# Patient Record
Sex: MALE | Race: WHITE | NOT HISPANIC OR LATINO | Employment: OTHER | URBAN - METROPOLITAN AREA
[De-identification: names, ages, dates, MRNs, and addresses within clinical notes are randomized per-mention and may not be internally consistent; named-entity substitution may affect disease eponyms.]

---

## 2017-01-13 ENCOUNTER — PRE VISIT (OUTPATIENT)
Dept: UROLOGY | Facility: CLINIC | Age: 77
End: 2017-01-13

## 2017-01-25 ENCOUNTER — TELEPHONE (OUTPATIENT)
Dept: UROLOGY | Facility: CLINIC | Age: 77
End: 2017-01-25

## 2017-01-25 ENCOUNTER — OFFICE VISIT (OUTPATIENT)
Dept: UROLOGY | Facility: CLINIC | Age: 77
End: 2017-01-25

## 2017-01-25 VITALS
DIASTOLIC BLOOD PRESSURE: 81 MMHG | SYSTOLIC BLOOD PRESSURE: 151 MMHG | WEIGHT: 163 LBS | HEART RATE: 53 BPM | BODY MASS INDEX: 24.14 KG/M2 | HEIGHT: 69 IN

## 2017-01-25 DIAGNOSIS — N52.9 ERECTILE DYSFUNCTION, UNSPECIFIED ERECTILE DYSFUNCTION TYPE: ICD-10-CM

## 2017-01-25 DIAGNOSIS — N39.46 MIXED INCONTINENCE: Primary | ICD-10-CM

## 2017-01-25 DIAGNOSIS — N52.9 ERECTILE DYSFUNCTION, UNSPECIFIED ERECTILE DYSFUNCTION TYPE: Primary | ICD-10-CM

## 2017-01-25 DIAGNOSIS — R35.1 NOCTURIA: ICD-10-CM

## 2017-01-25 RX ORDER — AZATHIOPRINE 50 MG/1
50 TABLET ORAL
COMMUNITY
Start: 2016-12-19 | End: 2017-01-25

## 2017-01-25 RX ORDER — CARVEDILOL 25 MG/1
25 TABLET ORAL
COMMUNITY
Start: 2016-06-22

## 2017-01-25 RX ORDER — ACETAMINOPHEN 160 MG
2000 TABLET,DISINTEGRATING ORAL
COMMUNITY
Start: 2016-05-06 | End: 2017-01-25

## 2017-01-25 RX ORDER — ATORVASTATIN CALCIUM 40 MG/1
40 TABLET, FILM COATED ORAL
COMMUNITY
Start: 2016-05-31 | End: 2020-07-31

## 2017-01-25 RX ORDER — CLOBETASOL PROPIONATE 0.5 MG/G
CREAM TOPICAL
COMMUNITY
Start: 2014-05-05 | End: 2017-01-25

## 2017-01-25 RX ORDER — ALFUZOSIN HYDROCHLORIDE 10 MG/1
10 TABLET, EXTENDED RELEASE ORAL DAILY
Qty: 90 TABLET | Refills: 1 | Status: SHIPPED | OUTPATIENT
Start: 2017-01-25 | End: 2017-01-31

## 2017-01-25 RX ORDER — ASPIRIN 81 MG/1
81 TABLET ORAL
COMMUNITY
Start: 2016-06-21 | End: 2017-01-25

## 2017-01-25 RX ORDER — AMILORIDE HYDROCHLORIDE 5 MG/1
TABLET ORAL
COMMUNITY
Start: 2016-11-28 | End: 2017-01-25

## 2017-01-25 ASSESSMENT — PAIN SCALES - GENERAL: PAINLEVEL: NO PAIN (0)

## 2017-01-25 NOTE — PROGRESS NOTES
"Office Visit Note      UROLOGIC DIAGNOSES:   Hematuria, mild lower urinary tract symptoms, ED     CURRENT INTERVENTIONS:       HISTORY:   Patient with history of hematuria, work up negative.   Patient s/p brachytherapy. Notes some frequency, lower urinary tract symptoms    Also presents for Edex rx, history of ED with good results from Edex.       PAST MEDICAL HISTORY:   Past Medical History   Diagnosis Date     Hypertension      Malignant neoplasm (H)      Prostate Cancer 2005 with seeds       PAST SURGICAL HISTORY:   Past Surgical History   Procedure Laterality Date     Orthopedic surgery       back for stenosis     Colonoscopy  2/20/2014     Procedure: COLONOSCOPY;  COLONOSCOPY;  Surgeon: Catalino Riddle MD;  Location:  GI       FAMILY HISTORY:   Family History   Problem Relation Age of Onset     DIABETES Maternal Grandmother      CANCER Maternal Grandfather        SOCIAL HISTORY:   Social History   Substance Use Topics     Smoking status: Never Smoker      Smokeless tobacco: Never Used     Alcohol Use: Yes      Comment: 3 to 4 times a week       Current Outpatient Prescriptions   Medication     atorvastatin (LIPITOR) 40 MG tablet     carvedilol (COREG) 25 MG tablet     rivaroxaban ANTICOAGULANT (XARELTO) 20 MG TABS tablet     alfuzosin (UROXATRAL) 10 MG 24 hr tablet     Cholecalciferol (VITAMIN D3 PO)     clobetasol (TEMOVATE) 0.05 % cream     triamcinolone (KENALOG) 0.1 % cream     alprostadil (EDEX) 20 MCG injection     AMILORIDE HCL PO     ibuprofen 200 MG capsule     losartan (COZAAR) 50 MG tablet     Multiple Vitamins-Minerals (OCUVITE PO)     aspirin 81 MG tablet     Psyllium (METAMUCIL PO)     [DISCONTINUED] aMILoride (MIDAMOR) 5 MG tablet     [DISCONTINUED] tolterodine (DETROL) 2 MG tablet     No current facility-administered medications for this visit.         PHYSICAL EXAM:    /81 mmHg  Pulse 53  Ht 1.753 m (5' 9\")  Wt 73.936 kg (163 lb)  BMI 24.06 kg/m2    HEENT: Normocephalic and " atraumatic   Cardiac: Not done  Back/Flank: Not done  CNS/PNS: Not done  Respiratory: Normal non-labored breathing  Abdomen: Soft nontender and nondistended  Peripheral Vascular: Not done  Mental Status: Not done    Penis: no hematoma or plaques from edex   Scrotal Skin: no lesions noted   Testicles: descended bilaterally   Epididymis: Not done  Digital Rectal Exam:       Cystoscopy: Not done    Imaging: None    Urinalysis: UA RESULTS:  Recent Labs   Lab Test  07/29/15   1630   COLOR  Yellow   APPEARANCE  Clear   URINEGLC  Negative   URINEBILI  Negative   URINEKETONE  Negative   SG  1.016   UBLD  Negative   URINEPH  6.0   PROTEIN  Negative   NITRITE  Negative   LEUKEST  Trace*   RBCU  0   WBCU  <1       PSA:     Post Void Residual:     Other labs: None today      IMPRESSION:  77y/o M with ED and lower urinary tract symptoms      PLAN:  Repeat trial of alfuzosin   Edex rx refill   Follow up in six months to review symptoms     Total Time: 15 minutes                                       Total in Consultation: greater than 50%       Discussed lower urinary tract symptoms, alfuzosin, Edex

## 2017-01-25 NOTE — PATIENT INSTRUCTIONS
Follow up with Dr. Amor in 6 months.    It was a pleasure meeting with you today.  Thank you for allowing me and my team the privilege of caring for you today.  YOU are the reason we are here, and I truly hope we provided you with the excellent service you deserve.  Please let us know if there is anything else we can do for you so that we can be sure you are leaving completely satisfied with your care experience.      CELESTINA Rodrigues

## 2017-01-25 NOTE — TELEPHONE ENCOUNTER
----- Message from Dariana Martinez RN sent at 1/25/2017  2:24 PM CST -----  Dr. Zuleyma Stubbs would like this patient to have a refill on his Edex and syringes. He uses the compounding pharmacy in his history. She would like him to have 11 refills. Can you please refill this for him. If you can let me know when you get it done, I would appreciate it.    Thanks again for all your help today!!!    Dariana

## 2017-01-25 NOTE — MR AVS SNAPSHOT
After Visit Summary   1/25/2017    Castro Arias    MRN: [id number]           Patient Information     Date Of Birth          1940        Visit Information        Provider Department      1/25/2017 9:30 AM Sanna Amor MD [facility]        Today's Diagnoses     Mixed incontinence    -  1     Nocturia           Care Instructions    Follow up with Dr. Amor in 6 months.    It was a pleasure meeting with you today.  Thank you for allowing me and my team the privilege of caring for you today.  YOU are the reason we are here, and I truly hope we provided you with the excellent service you deserve.  Please let us know if there is anything else we can do for you so that we can be sure you are leaving completely satisfied with your care experience.      Sally Leach RUMA        Follow-ups after your visit        Your next 10 appointments already scheduled     May 17, 2017  7:30 AM   Return Visit with Lucio Starks MD   [facility] ([facility])    [address], [address]  [facility] [address] [phone]            Jul 28, 2017 10:30 AM   (Arrive by 10:15 AM)   Return Visit with Sanna Amor MD   [facility] ([facility])    [address] [place] [address] [phone]              Who to contact     Please call your clinic at [phone] to:    Ask questions about your health    Make or cancel appointments    Discuss your medicines    Learn about your test results    Speak to your doctor   If you have compliments or concerns about an experience at your clinic, or if you wish to file a complaint, please contact [facility] Patient Relations at [phone] or email us at [email or web address]         Additional  "Information About Your Visit        [employer] Information     [employer] gives you secure access to your electronic health record. If you see a primary care provider, you can also send messages to your care team and make appointments. If you have questions, please call your primary care clinic.  If you do not have a primary care provider, please call [phone] and they will assist you.      [employer] is an electronic gateway that provides easy, online access to your medical records. With [employer], you can request a clinic appointment, read your test results, renew a prescription or communicate with your care team.     To access your existing account, please contact your [facility] or call [phone] for assistance.        Care EveryWhere ID     This is your Care EveryWhere ID. This could be used by other organizations to access your [facility] medical records  [id number]        Your Vitals Were     Pulse Height BMI (Body Mass Index)             53 1.753 m (5' 9\") 24.06 kg/m2          Blood Pressure from Last 3 Encounters:   01/25/17 151/81   05/04/16 160/80   03/02/16 130/70    Weight from Last 3 Encounters:   01/25/17 73.936 kg (163 lb)   07/29/15 79.969 kg (176 lb 4.8 oz)   07/02/14 79.379 kg (175 lb)              We Performed the Following     POST-VOID RESIDUAL BLADDER SCAN          Today's Medication Changes          These changes are accurate as of: 1/25/17 10:04 AM.  If you have any questions, ask your nurse or doctor.               Start taking these medicines.        Dose/Directions    alfuzosin 10 MG 24 hr tablet   Commonly known as:  UROXATRAL   Used for:  Nocturia   Started by:  Sanna Amor MD        Dose:  10 mg   Take 1 tablet (10 mg) by mouth daily   Quantity:  90 tablet   Refills:  1         These medicines have changed or have updated prescriptions.        Dose/Directions    alprostadil 20 MCG kit   Commonly known as:  EDEX   This may have changed:  Another " medication with the same name was removed. Continue taking this medication, and follow the directions you see here.   Used for:  Erectile dysfunction, unspecified erectile dysfunction type   Changed by:  Sanna Amor MD        Dose:  20 mcg   20 mcg by Intracavitary route as needed for erectile dysfunction use no more than 3 times per week   Quantity:  6 each   Refills:  12         Stop taking these medicines if you haven't already. Please contact your care team if you have questions.     clopidogrel 75 MG tablet   Commonly known as:  PLAVIX   Stopped by:  Sanna Amor MD           METOPROLOL TARTRATE PO   Stopped by:  Sanna Amor MD           sildenafil 100 MG cap/tab   Commonly known as:  VIAGRA   Stopped by:  Sanna Amor MD           SIMVASTATIN PO   Stopped by:  Sanna Amor MD                Where to get your medicines      These medications were sent to [facility] [address] [place], [place] [address]     Phone:  [phone]    - alfuzosin 10 MG 24 hr tablet             Primary Care Provider Office Phone # Fax #    [facility] [phone]       [address] [place] [address]        Thank you!     Thank you for choosing [facility] FOR PROSTATE AND UROLOGIC CANCERS  for your care. Our goal is always to provide you with excellent care. Hearing back from our patients is one way we can continue to improve our services. Please take a few minutes to complete the written survey that you may receive in the mail after your visit with us. Thank you!             Your Updated Medication List - Protect others around you: Learn how to safely use, store and throw away your medicines at www.disposemymeds.org.          This list is accurate as of: 1/25/17 10:04 AM.  Always use your most recent med list.                   Brand Name Dispense  Instructions for use    alfuzosin 10 MG 24 hr tablet    UROXATRAL    90 tablet    Take 1 tablet (10 mg) by mouth daily       alprostadil 20 MCG kit    EDEX    6 each    20 mcg by Intracavitary route as needed for erectile dysfunction use no more than 3 times per week       AMILORIDE HCL PO      Take 5 mg by mouth daily       aspirin 81 MG tablet      Take 1 tablet by mouth daily.       atorvastatin 40 MG tablet    LIPITOR     Take 40 mg by mouth       clobetasol 0.05 % cream    TEMOVATE     Apply topically as needed       COREG 25 MG tablet   Generic drug:  carvedilol      Take 25 mg by mouth       ibuprofen 200 MG capsule      Take 800 mg by mouth as needed.       losartan 50 MG tablet    COZAAR     Take 100 mg by mouth daily       METAMUCIL PO      Take 2 teaspoonful by mouth daily.       OCUVITE PO      Take 1 tablet by mouth 2 times daily.       triamcinolone 0.1 % cream    KENALOG     Apply topically as needed for irritation       VITAMIN D3 PO      Take by mouth daily       XARELTO 20 MG Tabs tablet   Generic drug:  rivaroxaban ANTICOAGULANT      Take 20 mg by mouth

## 2017-01-25 NOTE — NURSING NOTE
"Chief Complaint   Patient presents with     RECHECK     Follow up- ED, hematuria, nocturia       Initial Ht 1.753 m (5' 9\")  Wt 73.936 kg (163 lb)  BMI 24.06 kg/m2 Estimated body mass index is 24.06 kg/(m^2) as calculated from the following:    Height as of this encounter: 1.753 m (5' 9\").    Weight as of this encounter: 73.936 kg (163 lb).  BP completed using cuff size: CELESTINA Hunter    "

## 2017-01-25 NOTE — Clinical Note
1/25/2017       RE: Castro Arias  [address]  [facility] [address]     Dear Colleague,    Thank you for referring your patient, Castro Arias, to the [facility] AND [facility] FOR PROSTATE AND UROLOGIC CANCERS at [facility] Please see a copy of my visit note below.    Office Visit Note      UROLOGIC DIAGNOSES:   Hematuria, mild lower urinary tract symptoms, ED     CURRENT INTERVENTIONS:       HISTORY:   Patient with history of hematuria, work up negative.   Patient s/p brachytherapy. Notes some frequency, lower urinary tract symptoms    Also presents for Edex rx, history of ED with good results from Edex.       PAST MEDICAL HISTORY:   Past Medical History   Diagnosis Date     Hypertension      Malignant neoplasm (H)      Prostate Cancer 2005 with seeds       PAST SURGICAL HISTORY:   Past Surgical History   Procedure Laterality Date     Orthopedic surgery       back for stenosis     Colonoscopy  2/20/2014     Procedure: COLONOSCOPY;  COLONOSCOPY;  Surgeon: Catalino iRddle MD;  Location:  GI       FAMILY HISTORY:   Family History   Problem Relation Age of Onset     DIABETES Maternal Grandmother      CANCER Maternal Grandfather        SOCIAL HISTORY:   Social History   Substance Use Topics     Smoking status: Never Smoker      Smokeless tobacco: Never Used     Alcohol Use: Yes      Comment: 3 to 4 times a week       Current Outpatient Prescriptions   Medication     atorvastatin (LIPITOR) 40 MG tablet     carvedilol (COREG) 25 MG tablet     rivaroxaban ANTICOAGULANT (XARELTO) 20 MG TABS tablet     alfuzosin (UROXATRAL) 10 MG 24 hr tablet     Cholecalciferol (VITAMIN D3 PO)     clobetasol (TEMOVATE) 0.05 % cream     triamcinolone (KENALOG) 0.1 % cream     alprostadil (EDEX) 20 MCG injection     AMILORIDE HCL PO     ibuprofen 200 MG capsule     losartan (COZAAR) 50 MG tablet     Multiple Vitamins-Minerals (OCUVITE PO)     aspirin 81 MG tablet      "Psyllium (METAMUCIL PO)     [DISCONTINUED] aMILoride (MIDAMOR) 5 MG tablet     [DISCONTINUED] tolterodine (DETROL) 2 MG tablet     No current facility-administered medications for this visit.         PHYSICAL EXAM:    /81 mmHg  Pulse 53  Ht 1.753 m (5' 9\")  Wt 73.936 kg (163 lb)  BMI 24.06 kg/m2    HEENT: Normocephalic and atraumatic   Cardiac: Not done  Back/Flank: Not done  CNS/PNS: Not done  Respiratory: Normal non-labored breathing  Abdomen: Soft nontender and nondistended  Peripheral Vascular: Not done  Mental Status: Not done    Penis: no hematoma or plaques from edex   Scrotal Skin: no lesions noted   Testicles: descended bilaterally   Epididymis: Not done  Digital Rectal Exam:       Cystoscopy: Not done    Imaging: None    Urinalysis: UA RESULTS:  Recent Labs   Lab Test  07/29/15   1630   COLOR  Yellow   APPEARANCE  Clear   URINEGLC  Negative   URINEBILI  Negative   URINEKETONE  Negative   SG  1.016   UBLD  Negative   URINEPH  6.0   PROTEIN  Negative   NITRITE  Negative   LEUKEST  Trace*   RBCU  0   WBCU  <1       PSA:     Post Void Residual:     Other labs: None today      IMPRESSION:  75y/o M with ED and lower urinary tract symptoms      PLAN:  Repeat trial of alfuzosin   Edex rx refill   Follow up in six months to review symptoms     Total Time: 15 minutes                                       Total in Consultation: greater than 50%       Discussed lower urinary tract symptoms, alfuzosin, Edex                         Again, thank you for allowing me to participate in the care of your patient.      Sincerely,    Sanna Amor MD      "

## 2017-01-26 ENCOUNTER — TELEPHONE (OUTPATIENT)
Dept: UROLOGY | Facility: CLINIC | Age: 77
End: 2017-01-26

## 2017-01-30 NOTE — TELEPHONE ENCOUNTER
[facility] Prior Authorization Team   Phone: [phone]  Fax: [phone]    PA Initiation    Medication: alfuzosin (UROXATRAL) 10 MG 24 hr tablet  Insurance Company: [employer] - Phone [phone] Fax [phone]  Pharmacy Filling the Rx: [facility] [address] [place] [address]  Filling Pharmacy Phone: [phone]  Filling Pharmacy Fax: [phone]  Start Date: 1/30/2017

## 2017-01-31 DIAGNOSIS — R35.1 NOCTURIA: Primary | ICD-10-CM

## 2017-01-31 RX ORDER — ALFUZOSIN HYDROCHLORIDE 10 MG/1
10 TABLET, EXTENDED RELEASE ORAL DAILY
Qty: 90 TABLET | Refills: 1 | Status: SHIPPED | OUTPATIENT
Start: 2017-01-31 | End: 2020-07-31

## 2017-01-31 NOTE — TELEPHONE ENCOUNTER
Prior Authorization Approval    Authorization Effective Date: 12/30/2016  Authorization Expiration Date: 12/31/2017  Medication: alfuzosin (UROXATRAL) 10 MG 24 hr tablet- approved  Approved Dose/Quantity:   Reference #: [id number]   Insurance Company: [employer] - Phone [phone] Fax [phone]  Expected CoPay: $8.95     CoPay Card Available:      Foundation Assistance Needed:    Which Pharmacy is filling the prescription (Not needed for infusion/clinic administered): [facility] [address] [place] [address] - [address] [place] [address]  Pharmacy Notified: Yes  Patient Notified: Yes

## 2017-02-08 DIAGNOSIS — Z53.9 ERRONEOUS ENCOUNTER--DISREGARD: Primary | ICD-10-CM

## 2017-03-07 NOTE — TELEPHONE ENCOUNTER
[facility] Prior Authorization Team   Phone: [phone]  Fax: [phone]      PA Initiation    Medication: edex 20 mcg  1kit 6 injection  Insurance Company: [email or web address] - Phone [phone] Fax [phone]  Pharmacy Filling the Rx: [facility] [address]  Filling Pharmacy Phone: [phone]  Filling Pharmacy Fax: [phone]  Start Date: 3/7/2017

## 2017-03-09 NOTE — TELEPHONE ENCOUNTER
PRIOR AUTHORIZATION DENIED    Medication: edex 20 mcg  1kit 6 injection - DENIED    Denial Date: 3/7/2017    Denial Rational:     PA DENIED: (Medicare Part D) drug plan cannot cover drugs used for the treatment of sexual or erectile dysfunction or decreased libido (sex drive), and are excluded from coverage under the Medicare Part D drug benefit.  If you feel there is additional information related to this case that might affect this decision and an appeal is desired, please submit a written letter of medical necessity to our PA Team.          Appeal Information:

## 2017-05-17 ENCOUNTER — OFFICE VISIT (OUTPATIENT)
Dept: NEUROLOGY | Facility: CLINIC | Age: 77
End: 2017-05-17

## 2017-05-17 VITALS — SYSTOLIC BLOOD PRESSURE: 186 MMHG | RESPIRATION RATE: 18 BRPM | DIASTOLIC BLOOD PRESSURE: 104 MMHG | HEART RATE: 50 BPM

## 2017-05-17 DIAGNOSIS — H53.2 DIPLOPIA: Primary | ICD-10-CM

## 2017-05-17 RX ORDER — MYCOPHENOLATE MOFETIL 500 MG/1
500 TABLET ORAL 4 TIMES DAILY
COMMUNITY
Start: 2017-04-26

## 2017-05-17 NOTE — LETTER
May 17, 2017      Tc Oliva MD   [facility]    [address] [place] [address]      RE: Castro Arias   MRN: [id number]   : 1940      Dear Dr. Oliva:      This is in regard to followup on Castro Arias.  The patient returned today with chief complaint of diplopia, imbalance, cardiac dysrhythmia, hypertension, and findings of an asymptomatic cortical stroke.      The patient did return today with his daughter, Indy, who is a physician.  He does have the link monitor in place.  He has followed up with you and most recently he said his blood pressure was elevated in your office.  He did note though that he takes it at home and it typically is 121-125/70.  Today in our office, despite attempts to allow him to rest and relax his blood pressure was elevated again.  He continues to have diplopia.  This is present when he looks up.  He does not have it when he looks down.  He said that he did go to for routine eye exam but did not get a specific diagnosis as to the problem nor any treatment.  He has not had ptosis, nor trouble swallowing or chewing.  He denied any shortness of breath.  He is doing exercises for his balance.  He does not think it is any worse.  As you are aware, he also suffers from a chronic right foot drop related to lumbar disk problems.  His last lipids did show an LDL of 68 and an HDL of 41.  He denied to me other complaints including headache nor has he had jaw claudication or scalp tenderness.      Neurologic examination revealed a pleasant man.  His initial blood pressure was 186/104 with a pulse of 50 using the machine.  Then with rest and rechecking it with a large soft cuff it was 184/88 with a pulse of 52.  He did not have any obvious eyeground changes, but I cannot see distally in his retina and he has not had recent fundus examination evidently for hypertension.  Otherwise, he does walk with a mild right foot drop.  He could do tandem, which would be  unusual for his age and had a negative Romberg.  He had no dysmetria.  He had reflexes that were present and symmetric except for an absent right ankle jerk.  His toes were downward going to plantar stimulation.  He had moderately reduced vibratory sense at the ankles, but normal position sense and no obvious sensory loss to light touch.  Strength testing showed moderate weakness of the right anterior tibialis muscle.  He did have vertical diplopia looking up, but he did not have any obvious extraocular movement findings on his gross neurologic exam.  I could not auscultate cervical bruits and he had a regular cardiac rhythm but he did have a probable S4 gallop.      I reviewed his last B12 level 16 months ago and it was 382 picograms.  His vitamin D level was 42.5.      IMPRESSION:   1.  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     2.  Previously documented episode of atrial fibrillation, now on Xarelto.   3.  Chronic complaints of imbalance with probable multiple causes for decreased proprioception including aging and lumbar disk disease, as well as right foot drop and diplopia.     4.  Vertical diplopia.   5.  Paroxysmal hypertension.      I have recommended the patient return to his cardiologist and consider a 24-hour blood pressure monitor.  He has evidently had this in the past.  He does need to have careful eye examination and I have asked that he be seen by the neuro-ophthalmologist at [facility].  This will help settle whether he has chronic retinal changes related to hypertension that does need more aggressive treatment as well as causation for his diplopia.  I have asked that he continue his exercises.  He is aware of warning signs and risk factors of stroke.  He is to be seen in this office on a p.r.n. basis and again in 6-12 months.      Thank you for allowing me to see this patient.      Sincerely yours,       Lucio Starks MD      I spent 40 minutes with the patient today.  Over 50% of the  time this involved in counseling and coordination of care.    KAMI SOLORZANO MD       D: 2017 10:15   T: 2017 13:03   MT: MARTA    Name:     RUSSELL GOLDSTEIN   MRN:      [id number]        Account:      [id number]   :      1940           Service Date: 2017    Document: [id number]

## 2017-05-17 NOTE — MR AVS SNAPSHOT
After Visit Summary   5/17/2017    Castro Arias    MRN: [id number]           Patient Information     Date Of Birth          1940        Visit Information        Provider Department      5/17/2017 7:30 AM Lucio Starks MD [facility]         Follow-ups after your visit        Your next 10 appointments already scheduled     May 17, 2017  7:30 AM CDT   Return Visit with Lucio Starks MD   [facility] ([facility])    [address], [address]  [facility] [address] [phone]            Jul 28, 2017 10:30 AM CDT   (Arrive by 10:15 AM)   Return Visit with Sanna Amor MD   [facility] and [facility] Prostate and Urologic Cancers ([facility])    [address]  [address] [facility] [address] [phone]              Who to contact     Please call your clinic at [phone] to:    Ask questions about your health    Make or cancel appointments    Discuss your medicines    Learn about your test results    Speak to your doctor   If you have compliments or concerns about an experience at your clinic, or if you wish to file a complaint, please contact [facility] Patient Relations at [phone] or email us at [email or web address]         Additional Information About Your Visit        MyChart Information     [employer] gives you secure access to your electronic health record. If you see a primary care provider, you can also send messages to your care team and make appointments. If you have questions, please call your primary care clinic.  If you do not have a primary care provider, please call [phone] and they will assist you.      [employer] is an electronic gateway that provides easy, online access to your medical records. With [employer], you can request a clinic appointment, read  your test results, renew a prescription or communicate with your care team.     To access your existing account, please contact your [facility] or call [phone] for assistance.        Care EveryWhere ID     This is your Care EveryWhere ID. This could be used by other organizations to access your [place] medical records  [id number]         Blood Pressure from Last 3 Encounters:   01/25/17 151/81   05/04/16 160/80   03/02/16 130/70    Weight from Last 3 Encounters:   01/25/17 163 lb (73.9 kg)   07/29/15 176 lb 4.8 oz (80 kg)   07/02/14 175 lb (79.4 kg)              Today, you had the following     No orders found for display       Primary Care Provider Office Phone # Fax #    [facility] [phone]       [address] [place] [address]        Thank you!     Thank you for choosing [facility]  for your care. Our goal is always to provide you with excellent care. Hearing back from our patients is one way we can continue to improve our services. Please take a few minutes to complete the written survey that you may receive in the mail after your visit with us. Thank you!             Your Updated Medication List - Protect others around you: Learn how to safely use, store and throw away your medicines at www.disposemymeds.org.          This list is accurate as of: 5/15/17  8:43 AM.  Always use your most recent med list.                   Brand Name Dispense Instructions for use    alfuzosin 10 MG 24 hr tablet    UROXATRAL    90 tablet    Take 1 tablet (10 mg) by mouth daily       alprostadil 20 MCG kit    EDEX    60 mcg    20 mcg by Intracavitary route as needed for erectile dysfunction use no more than 3 times per week       AMILORIDE HCL PO      Take 5 mg by mouth daily       aspirin 81 MG tablet      Take 1 tablet by mouth daily.       atorvastatin 40 MG tablet    LIPITOR     Take 40 mg by mouth        clobetasol 0.05 % cream    TEMOVATE     Apply topically as needed       COREG 25 MG tablet   Generic drug:  carvedilol      Take 25 mg by mouth       ibuprofen 200 MG capsule      Take 800 mg by mouth as needed.       losartan 50 MG tablet    COZAAR     Take 100 mg by mouth daily       METAMUCIL PO      Take 2 teaspoonful by mouth daily.       OCUVITE PO      Take 1 tablet by mouth 2 times daily.       triamcinolone 0.1 % cream    KENALOG     Apply topically as needed for irritation       VITAMIN D3 PO      Take by mouth daily       XARELTO 20 MG Tabs tablet   Generic drug:  rivaroxaban ANTICOAGULANT      Take 20 mg by mouth

## 2017-05-19 NOTE — PROGRESS NOTES
May 17, 2017      Tc Oliva MD   [facility]    [address] [place] [address]      RE: Castro Arias   MRN: [id number]   : 1940      Dear Dr. Oliva:      This is in regard to followup on Castro Arias.  The patient returned today with chief complaint of diplopia, imbalance, cardiac dysrhythmia, hypertension, and findings of an asymptomatic cortical stroke.      The patient did return today with his daughter, Indy, who is a physician.  He does have the link monitor in place.  He has followed up with you and most recently he said his blood pressure was elevated in your office.  He did note though that he takes it at home and it typically is 121-125/70.  Today in our office, despite attempts to allow him to rest and relax his blood pressure was elevated again.  He continues to have diplopia.  This is present when he looks up.  He does not have it when he looks down.  He said that he did go to for routine eye exam but did not get a specific diagnosis as to the problem nor any treatment.  He has not had ptosis, nor trouble swallowing or chewing.  He denied any shortness of breath.  He is doing exercises for his balance.  He does not think it is any worse.  As you are aware, he also suffers from a chronic right foot drop related to lumbar disk problems.  His last lipids did show an LDL of 68 and an HDL of 41.  He denied to me other complaints including headache nor has he had jaw claudication or scalp tenderness.      Neurologic examination revealed a pleasant man.  His initial blood pressure was 186/104 with a pulse of 50 using the machine.  Then with rest and rechecking it with a large soft cuff it was 184/88 with a pulse of 52.  He did not have any obvious eyeground changes, but I cannot see distally in his retina and he has not had recent fundus examination evidently for hypertension.  Otherwise, he does walk with a mild right foot drop.  He could do tandem, which would be  unusual for his age and had a negative Romberg.  He had no dysmetria.  He had reflexes that were present and symmetric except for an absent right ankle jerk.  His toes were downward going to plantar stimulation.  He had moderately reduced vibratory sense at the ankles, but normal position sense and no obvious sensory loss to light touch.  Strength testing showed moderate weakness of the right anterior tibialis muscle.  He did have vertical diplopia looking up, but he did not have any obvious extraocular movement findings on his gross neurologic exam.  I could not auscultate cervical bruits and he had a regular cardiac rhythm but he did have a probable S4 gallop.      I reviewed his last B12 level 16 months ago and it was 382 picograms.  His vitamin D level was 42.5.      IMPRESSION:   1.  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     2.  Previously documented episode of atrial fibrillation, now on Xarelto.   3.  Chronic complaints of imbalance with probable multiple causes for decreased proprioception including aging and lumbar disk disease, as well as right foot drop and diplopia.     4.  Vertical diplopia.   5.  Paroxysmal hypertension.      I have recommended the patient return to his cardiologist and consider a 24-hour blood pressure monitor.  He has evidently had this in the past.  He does need to have careful eye examination and I have asked that he be seen by the neuro-ophthalmologist at [facility].  This will help settle whether he has chronic retinal changes related to hypertension that does need more aggressive treatment as well as causation for his diplopia.  I have asked that he continue his exercises.  He is aware of warning signs and risk factors of stroke.  He is to be seen in this office on a p.r.n. basis and again in 6-12 months.      Thank you for allowing me to see this patient.      Sincerely yours,       Lucio Starks MD      I spent 40 minutes with the patient today.  Over 50% of the  time this involved in counseling and coordination of care.         KAMI SOLORZANO MD             D: 2017 10:15   T: 2017 13:03   MT: MARTA      Name:     RUSSELL GOLDSTEIN   MRN:      [id number]        Account:      [id number]   :      1940           Service Date: 2017      Document: [id number]

## 2017-07-17 ENCOUNTER — PRE VISIT (OUTPATIENT)
Dept: UROLOGY | Facility: CLINIC | Age: 77
End: 2017-07-17

## 2018-01-22 ENCOUNTER — PRE VISIT (OUTPATIENT)
Dept: UROLOGY | Facility: CLINIC | Age: 78
End: 2018-01-22

## 2018-01-24 ENCOUNTER — OFFICE VISIT (OUTPATIENT)
Dept: UROLOGY | Facility: CLINIC | Age: 78
End: 2018-01-24
Payer: COMMERCIAL

## 2018-01-24 VITALS
HEART RATE: 51 BPM | DIASTOLIC BLOOD PRESSURE: 82 MMHG | WEIGHT: 179.4 LBS | SYSTOLIC BLOOD PRESSURE: 177 MMHG | BODY MASS INDEX: 27.19 KG/M2 | HEIGHT: 68 IN

## 2018-01-24 DIAGNOSIS — C61 MALIGNANT NEOPLASM OF PROSTATE (H): Primary | ICD-10-CM

## 2018-01-24 DIAGNOSIS — C61 MALIGNANT NEOPLASM OF PROSTATE (H): ICD-10-CM

## 2018-01-24 DIAGNOSIS — R35.0 URINARY FREQUENCY: ICD-10-CM

## 2018-01-24 LAB — PSA SERPL-MCNC: <0.01 UG/L (ref 0–4)

## 2018-01-24 RX ORDER — TOLTERODINE 2 MG/1
2 CAPSULE, EXTENDED RELEASE ORAL DAILY
Qty: 30 CAPSULE | Refills: 3 | Status: SHIPPED | OUTPATIENT
Start: 2018-01-24 | End: 2020-07-31

## 2018-01-24 ASSESSMENT — PAIN SCALES - GENERAL
PAINLEVEL: NO PAIN (0)
PAINLEVEL: NO PAIN (0)

## 2018-01-24 NOTE — PROGRESS NOTES
Office Visit Note      UROLOGIC DIAGNOSES:   Hematuria, mild lower urinary tract symptoms, ED     CURRENT INTERVENTIONS:       HISTORY:   Patient with history of hematuria, work up negative.   Patient s/p brachytherapy. Notes some frequency, lower urinary tract symptoms    Also presents for Edex rx, history of ED with good results from Edex.     Patient with frequency and lower urinary tract symptoms. Did not notice any improvement with alfuzosin. Of note has not had PSA in the past few years.       We discussed cystoscopy to rule out stricture. Also discussed anticholinergic for frequency post radiation.   Patient opted for cystoscopy today.       PAST MEDICAL HISTORY:   Past Medical History:   Diagnosis Date     Hypertension      Malignant neoplasm (H)     Prostate Cancer 2005 with seeds       PAST SURGICAL HISTORY:   Past Surgical History:   Procedure Laterality Date     COLONOSCOPY  2/20/2014    Procedure: COLONOSCOPY;  COLONOSCOPY;  Surgeon: Catalino Riddle MD;  Location:  GI     ORTHOPEDIC SURGERY      back for stenosis       FAMILY HISTORY:   Family History   Problem Relation Age of Onset     DIABETES Maternal Grandmother      CANCER Maternal Grandfather        SOCIAL HISTORY:   Social History   Substance Use Topics     Smoking status: Never Smoker     Smokeless tobacco: Never Used     Alcohol use Yes      Comment: 3 to 4 times a week       Current Outpatient Prescriptions   Medication     mycophenolate (CELLCEPT - GENERIC EQUIVALENT) 500 MG tablet     alfuzosin (UROXATRAL) 10 MG 24 hr tablet     atorvastatin (LIPITOR) 40 MG tablet     carvedilol (COREG) 25 MG tablet     rivaroxaban ANTICOAGULANT (XARELTO) 20 MG TABS tablet     alprostadil (EDEX) 20 MCG kit     Cholecalciferol (VITAMIN D3 PO)     clobetasol (TEMOVATE) 0.05 % cream     triamcinolone (KENALOG) 0.1 % cream     AMILORIDE HCL PO     ibuprofen 200 MG capsule     losartan (COZAAR) 50 MG tablet     Multiple Vitamins-Minerals (OCUVITE PO)      aspirin 81 MG tablet     Psyllium (METAMUCIL PO)     [DISCONTINUED] tolterodine (DETROL) 2 MG tablet     No current facility-administered medications for this visit.          PHYSICAL EXAM:    There were no vitals taken for this visit.    HEENT: Normocephalic and atraumatic   Cardiac: Not done  Back/Flank: Not done  CNS/PNS: Not done  Respiratory: Normal non-labored breathing  Abdomen: Soft nontender and nondistended  Peripheral Vascular: Not done  Mental Status: Not done    Penis: no hematoma or plaques from edex   Scrotal Skin: no lesions noted   Testicles: descended bilaterally   Epididymis: Not done  Digital Rectal Exam:       Cystoscopy:       January 24, 2018 CYSTOSCOPY:  The patient was brought to the procedures room where he was placed in the supine position.  He was prepped and draped in a sterile fashion.  A #16-Persian flexible cystoscope was introduced into the urinary bladder.  The anterior urethra and membranous urethra were negative.  The prostatic urethra was noted rakan have radiation changes with some stiffness in the urethra. Within the urinary bladder, there was no evidence of stones, tumors, or growths.  The ureteral orifices were well visualized bilaterally and found to have clear efflux of urine.  There was no evidence of recurrence.  The patient had minimal trabeculation.    On retroflex view, no lesions were seen.       Imaging: None    Urinalysis: UA RESULTS:  Recent Labs   Lab Test  07/29/15   1630   COLOR  Yellow   APPEARANCE  Clear   URINEGLC  Negative   URINEBILI  Negative   URINEKETONE  Negative   SG  1.016   UBLD  Negative   URINEPH  6.0   PROTEIN  Negative   NITRITE  Negative   LEUKEST  Trace*   RBCU  0   WBCU  <1       PSA:     Post Void Residual: 16ml    Other labs: None today      IMPRESSION:  76y/o M with ED and lower urinary tract symptoms, s/p brachytherapy        PLAN:  Refill of Edex today   Will give low dose tolterodine   Follow up in 6-8 weeks for uroflow/PVR     Total  Time: 15 minutes                                       Total in Consultation: greater than 50%

## 2018-01-24 NOTE — NURSING NOTE
"Chief Complaint   Patient presents with     RECHECK     LUTS follow up/medication check       Initial Ht 1.727 m (5' 8\")  Wt 81.4 kg (179 lb 6.4 oz)  BMI 27.28 kg/m2 Estimated body mass index is 27.28 kg/(m^2) as calculated from the following:    Height as of this encounter: 1.727 m (5' 8\").    Weight as of this encounter: 81.4 kg (179 lb 6.4 oz).  Medication Reconciliation: complete     CELESTINA Rodrigues    "

## 2018-01-24 NOTE — MR AVS SNAPSHOT
After Visit Summary   1/24/2018    Castro Arias    MRN: [id number]           Patient Information     Date Of Birth          1940        Visit Information        Provider Department      1/24/2018 10:00 AM Sanna Amor MD [facility]rolog[facility]        Today's Diagnoses     Malignant neoplasm of prostate (H)    -  1    Urinary frequency          Care Instructions    Medication has been sent to your pharmacy.    Schedule a 2 month follow up with Dr. Amor for medication check.    It was a pleasure meeting with you today.  Thank you for allowing me and my team the privilege of caring for you today.  YOU are the reason we are here, and I truly hope we provided you with the excellent service you deserve.  Please let us know if there is anything else we can do for you so that we can be sure you are leaving completely satisfied with your care experience.      Sally Leach RUMA          Follow-ups after your visit        Your next 10 appointments already scheduled     May 09, 2018  9:00 AM CDT   (Arrive by 8:45 AM)   Return Visit with Sanna Amor MD   [facility]rolog[facility] ([facility])    [address] [place] [address] [phone]              Who to contact     Please call your clinic at [phone] to:    Ask questions about your health    Make or cancel appointments    Discuss your medicines    Learn about your test results    Speak to your doctor   If you have compliments or concerns about an experience at your clinic, or if you wish to file a complaint, please contact [facility] Patient Relations at [phone] or email us at [email or web address]         Additional Information About Your Visit        MyChart Information     youmagt gives you secure access to your electronic health record. If you  "see a primary care provider, you can also send messages to your care team and make appointments. If you have questions, please call your primary care clinic.  If you do not have a primary care provider, please call [phone] and they will assist you.      [employer] is an electronic gateway that provides easy, online access to your medical records. With [employer], you can request a clinic appointment, read your test results, renew a prescription or communicate with your care team.     To access your existing account, please contact your [facility] or call [phone] for assistance.        Care EveryWhere ID     This is your Care EveryWhere ID. This could be used by other organizations to access your [place] medical records  [id number]        Your Vitals Were     Pulse Height BMI (Body Mass Index)             51 1.727 m (5' 8\") 27.28 kg/m2          Blood Pressure from Last 3 Encounters:   01/24/18 177/82   05/17/17 (!) 186/104   01/25/17 151/81    Weight from Last 3 Encounters:   01/24/18 81.4 kg (179 lb 6.4 oz)   01/25/17 73.9 kg (163 lb)   07/29/15 80 kg (176 lb 4.8 oz)              We Performed the Following     POST-VOID RESIDUAL BLADDER SCAN          Today's Medication Changes          These changes are accurate as of 1/24/18 11:37 AM.  If you have any questions, ask your nurse or doctor.               Start taking these medicines.        Dose/Directions    tolterodine 2 MG 24 hr capsule   Commonly known as:  DETROL LA   Used for:  Urinary frequency   Started by:  Sanna Amor MD        Dose:  2 mg   Take 1 capsule (2 mg) by mouth daily   Quantity:  30 capsule   Refills:  3            Where to get your medicines      These medications were sent to [facility] [address] [place] [address]  [address] [facility] [address]     Phone:  [phone]     tolterodine 2 MG 24 hr capsule                Primary Care Provider Office " Phone # Fax #    [facility] [phone] [address] [address]        Equal Access to Services     JOYCE BAXTER : Hadii aad ku hadwindycarmen Jenifersavita, cuauhtemoc connerwilman, madelaine kagwenda carloz, elaine baptiste laSohailmarcelino wesley. So [facility] [phone].    ATENCIÓN: Si habla español, tiene a logno disposición servicios gratuitos de asistencia lingüística. Llame al [phone].    We comply with applicable federal civil rights laws and [place] laws. We do not discriminate on the basis of race, color, national origin, age, disability, sex, sexual orientation, or gender identity.            Thank you!     Thank you for choosing [facility] UROLOGY AND [facility] FOR PROSTATE AND UROLOGIC CANCERS  for your care. Our goal is always to provide you with excellent care. Hearing back from our patients is one way we can continue to improve our services. Please take a few minutes to complete the written survey that you may receive in the mail after your visit with us. Thank you!             Your Updated Medication List - Protect others around you: Learn how to safely use, store and throw away your medicines at www.disposemymeds.org.          This list is accurate as of 1/24/18 11:37 AM.  Always use your most recent med list.                   Brand Name Dispense Instructions for use Diagnosis    alfuzosin 10 MG 24 hr tablet    UROXATRAL    90 tablet    Take 1 tablet (10 mg) by mouth daily    Nocturia       alprostadil 20 MCG kit    EDEX    60 mcg    20 mcg by Intracavitary route as needed for erectile dysfunction use no more than 3 times per week    Erectile dysfunction, unspecified erectile dysfunction type       AMILORIDE HCL PO      Take 5 mg by mouth daily        aspirin 81 MG tablet      Take 1 tablet by mouth daily.        atorvastatin 40 MG tablet    LIPITOR     Take 40 mg by mouth        clobetasol 0.05 % cream    TEMOVATE     Apply topically as needed    Celiac disease, Gait  disturbance, Prostate cancer (H), Mixed incontinence, Diplopia, Vitamin D deficiency       COREG 25 MG tablet   Generic drug:  carvedilol      Take 25 mg by mouth        ibuprofen 200 MG capsule      Take 800 mg by mouth as needed.        losartan 50 MG tablet    COZAAR     Take 100 mg by mouth daily        METAMUCIL PO      Take 2 teaspoonful by mouth daily.        mycophenolate 500 MG tablet    GENERIC EQUIVALENT     Take 500 mg by mouth 4 times daily        OCUVITE PO      Take 1 tablet by mouth 2 times daily.        tolterodine 2 MG 24 hr capsule    DETROL LA    30 capsule    Take 1 capsule (2 mg) by mouth daily    Urinary frequency       triamcinolone 0.1 % cream    KENALOG     Apply topically as needed for irritation    Celiac disease, Gait disturbance, Prostate cancer (H), Mixed incontinence, Diplopia, Vitamin D deficiency       VITAMIN D3 PO      Take by mouth daily    Celiac disease, Gait disturbance, Prostate cancer (H), Mixed incontinence, Diplopia, Vitamin D deficiency       XARELTO 20 MG Tabs tablet   Generic drug:  rivaroxaban ANTICOAGULANT      Take 20 mg by mouth

## 2018-01-24 NOTE — LETTER
1/24/2018       RE: Castro Arias  [address]  [facility] [address]     Dear Colleague,    Thank you for referring your patient, Castro Arias, to the [facility] UROLOGY AND INST FOR PROSTATE AND UROLOGIC CANCERS at [facility]. Please see a copy of my visit note below.    Office Visit Note      UROLOGIC DIAGNOSES:   Hematuria, mild lower urinary tract symptoms, ED     CURRENT INTERVENTIONS:       HISTORY:   Patient with history of hematuria, work up negative.   Patient s/p brachytherapy. Notes some frequency, lower urinary tract symptoms    Also presents for Edex rx, history of ED with good results from Edex.     Patient with frequency and lower urinary tract symptoms. Did not notice any improvement with alfuzosin. Of note has not had PSA in the past few years.       We discussed cystoscopy to rule out stricture. Also discussed anticholinergic for frequency post radiation.   Patient opted for cystoscopy today.       PAST MEDICAL HISTORY:   Past Medical History:   Diagnosis Date     Hypertension      Malignant neoplasm (H)     Prostate Cancer 2005 with seeds       PAST SURGICAL HISTORY:   Past Surgical History:   Procedure Laterality Date     COLONOSCOPY  2/20/2014    Procedure: COLONOSCOPY;  COLONOSCOPY;  Surgeon: Catalino Riddle MD;  Location:  GI     ORTHOPEDIC SURGERY      back for stenosis       FAMILY HISTORY:   Family History   Problem Relation Age of Onset     DIABETES Maternal Grandmother      CANCER Maternal Grandfather        SOCIAL HISTORY:   Social History   Substance Use Topics     Smoking status: Never Smoker     Smokeless tobacco: Never Used     Alcohol use Yes      Comment: 3 to 4 times a week       Current Outpatient Prescriptions   Medication     mycophenolate (CELLCEPT - GENERIC EQUIVALENT) 500 MG tablet     alfuzosin (UROXATRAL) 10 MG 24 hr tablet     atorvastatin (LIPITOR) 40 MG tablet     carvedilol (COREG) 25 MG tablet      rivaroxaban ANTICOAGULANT (XARELTO) 20 MG TABS tablet     alprostadil (EDEX) 20 MCG kit     Cholecalciferol (VITAMIN D3 PO)     clobetasol (TEMOVATE) 0.05 % cream     triamcinolone (KENALOG) 0.1 % cream     AMILORIDE HCL PO     ibuprofen 200 MG capsule     losartan (COZAAR) 50 MG tablet     Multiple Vitamins-Minerals (OCUVITE PO)     aspirin 81 MG tablet     Psyllium (METAMUCIL PO)     [DISCONTINUED] tolterodine (DETROL) 2 MG tablet     No current facility-administered medications for this visit.          PHYSICAL EXAM:    There were no vitals taken for this visit.    HEENT: Normocephalic and atraumatic   Cardiac: Not done  Back/Flank: Not done  CNS/PNS: Not done  Respiratory: Normal non-labored breathing  Abdomen: Soft nontender and nondistended  Peripheral Vascular: Not done  Mental Status: Not done    Penis: no hematoma or plaques from edex   Scrotal Skin: no lesions noted   Testicles: descended bilaterally   Epididymis: Not done  Digital Rectal Exam:       Cystoscopy:       January 24, 2018 CYSTOSCOPY:  The patient was brought to the procedures room where he was placed in the supine position.  He was prepped and draped in a sterile fashion.  A #16-Mauritanian flexible cystoscope was introduced into the urinary bladder.  The anterior urethra and membranous urethra were negative.  The prostatic urethra was noted rakan have radiation changes with some stiffness in the urethra. Within the urinary bladder, there was no evidence of stones, tumors, or growths.  The ureteral orifices were well visualized bilaterally and found to have clear efflux of urine.  There was no evidence of recurrence.  The patient had minimal trabeculation.    On retroflex view, no lesions were seen.       Imaging: None    Urinalysis: UA RESULTS:  Recent Labs   Lab Test  07/29/15   1630   COLOR  Yellow   APPEARANCE  Clear   URINEGLC  Negative   URINEBILI  Negative   URINEKETONE  Negative   SG  1.016   UBLD  Negative   URINEPH  6.0   PROTEIN   Negative   NITRITE  Negative   LEUKEST  Trace*   RBCU  0   WBCU  <1       PSA:     Post Void Residual: 16ml    Other labs: None today      IMPRESSION:  76y/o M with ED and lower urinary tract symptoms, s/p brachytherapy        PLAN:  Refill of Edex today   Will give low dose tolterodine   Follow up in 6-8 weeks for uroflow/PVR     Total Time: 15 minutes                                       Total in Consultation: greater than 50%     Again, thank you for allowing me to participate in the care of your patient.      Sincerely,    Sanna Amor MD

## 2018-01-24 NOTE — PATIENT INSTRUCTIONS
Medication has been sent to your pharmacy.    Schedule a 2 month follow up with Dr. Amor for medication check.    It was a pleasure meeting with you today.  Thank you for allowing me and my team the privilege of caring for you today.  YOU are the reason we are here, and I truly hope we provided you with the excellent service you deserve.  Please let us know if there is anything else we can do for you so that we can be sure you are leaving completely satisfied with your care experience.      CELESTINA Rodrigues

## 2018-01-24 NOTE — NURSING NOTE
Invasive Procedure Safety Checklist:    Procedure: Cystoscopy    Action: Complete sections and checkboxes as appropriate.    Pre-procedure:  1. Patient ID Verified with 2 identifiers (Erica and  or MRN) : YES    2. Procedure and site verified with patient/designee (when able) : YES    3. Accurate consent documentation in medical record : YES    4. H&P (or appropriate assessment) documented in medical record : NO  H&P must be up to 30 days prior to procedure an updated within 24 hours of                 Procedure as applicable.     5. Relevant diagnostic and radiology test results appropriately labeled and displayed as applicable : NO    6. Blood products, implants, devices, and/or special equipment available for the procedure as applicable : NO    7. Procedure site(s) marked with provider initials [Exclusions: ] : NO    8. Marking not required. Reason : Yes  Procedure does not require site marking    Time Out:     Time-Out performed immediately prior to starting procedure, including verbal and active participation of all team members addressing: YES    1. Correct patient identity.  2. Confirmed that the correct side and site are marked.  3. An accurate procedure to be done.  4. Agreement on the procedure to be done.  5. Correct patient position.  6. Relevant images and results are properly labeled and appropriately displayed.  7. The need to administer antibiotics or fluids for irrigation purposes during the procedure as applicable.  8. Safety precautions based on patient history or medication use.    During Procedure: Verification of correct person, site, and procedure occurs any time the responsibility for care of the patient is transferred to another member of the care team.    CELESTINA Rodrigues

## 2018-01-30 DIAGNOSIS — N52.9 ERECTILE DYSFUNCTION, UNSPECIFIED ERECTILE DYSFUNCTION TYPE: ICD-10-CM

## 2018-01-31 ENCOUNTER — TELEPHONE (OUTPATIENT)
Dept: UROLOGY | Facility: CLINIC | Age: 78
End: 2018-01-31

## 2018-01-31 NOTE — TELEPHONE ENCOUNTER
----- Message from Enriqueta Susan sent at 1/31/2018 11:25 AM CST -----  Regarding: Rx refill  Contact: [phone]  Pt need his refill of alprostadil (EDEX) 20 MCG kit sent to a different pharmacy. He needs it sent to [facility], [address] [place] [address]  [place] [address]. If you have any questions, he can be contacted at [phone].    Thank you,    Enriqueta    Please DO NOT send this message and/or reply back to sender. Call Center Representatives DO NOT respond to messages.

## 2018-05-01 ENCOUNTER — PRE VISIT (OUTPATIENT)
Dept: UROLOGY | Facility: CLINIC | Age: 78
End: 2018-05-01

## 2018-12-18 ENCOUNTER — PRE VISIT (OUTPATIENT)
Dept: UROLOGY | Facility: CLINIC | Age: 78
End: 2018-12-18

## 2018-12-18 NOTE — PROGRESS NOTES
Patient is coming in to see  for a flow/PVR, called patient and left a message to please come with a full bladder.

## 2019-01-04 ENCOUNTER — TELEPHONE (OUTPATIENT)
Dept: UROLOGY | Facility: CLINIC | Age: 79
End: 2019-01-04

## 2019-01-04 NOTE — TELEPHONE ENCOUNTER
[facility] Call Center    Phone Message    May a detailed message be left on voicemail: yes    Reason for Call: Other: Patient called in to see if Dr. Amor would fill PROSTAGLANDIN for him, he says it expires on Jan 31. He is wondering if Dr. Amor will write him a new Rx or if he has to come in to see her first. Please give pt a call back to discuss further.      Action Taken: Message routed to:  [facility] ([facility] Urology

## 2019-01-04 NOTE — TELEPHONE ENCOUNTER
Message left on patient stating that he needs to follow up with Dr. Amor to receive a refill on his Edex prescription.     Deanna Mckinney MA

## 2019-01-11 ENCOUNTER — OFFICE VISIT (OUTPATIENT)
Dept: UROLOGY | Facility: CLINIC | Age: 79
End: 2019-01-11
Payer: COMMERCIAL

## 2019-01-11 VITALS
DIASTOLIC BLOOD PRESSURE: 84 MMHG | HEIGHT: 68 IN | BODY MASS INDEX: 26.75 KG/M2 | WEIGHT: 176.5 LBS | HEART RATE: 47 BPM | SYSTOLIC BLOOD PRESSURE: 173 MMHG

## 2019-01-11 DIAGNOSIS — N52.35 ERECTILE DYSFUNCTION FOLLOWING RADIATION THERAPY: ICD-10-CM

## 2019-01-11 DIAGNOSIS — R35.0 URINARY FREQUENCY: Primary | ICD-10-CM

## 2019-01-11 ASSESSMENT — MIFFLIN-ST. JEOR: SCORE: 1485.1

## 2019-01-11 ASSESSMENT — PATIENT HEALTH QUESTIONNAIRE - PHQ9
SUM OF ALL RESPONSES TO PHQ QUESTIONS 1-9: 0
10. IF YOU CHECKED OFF ANY PROBLEMS, HOW DIFFICULT HAVE THESE PROBLEMS MADE IT FOR YOU TO DO YOUR WORK, TAKE CARE OF THINGS AT HOME, OR GET ALONG WITH OTHER PEOPLE: NOT DIFFICULT AT ALL
SUM OF ALL RESPONSES TO PHQ QUESTIONS 1-9: 0

## 2019-01-11 ASSESSMENT — PAIN SCALES - GENERAL: PAINLEVEL: NO PAIN (0)

## 2019-01-11 NOTE — PATIENT INSTRUCTIONS
Please follow up in a year     It was a pleasure meeting with you today.  Thank you for allowing me and my team the privilege of caring for you today.  YOU are the reason we are here, and I truly hope we provided you with the excellent service you deserve.  Please let us know if there is anything else we can do for you so that we can be sure you are leaving completely satisfied with your care experience.

## 2019-01-11 NOTE — NURSING NOTE
"Chief Complaint   Patient presents with     Follow Up     annual F/U w? Flow PVR       Blood pressure 173/84, pulse (!) 47, height 1.727 m (5' 8\"), weight 80.1 kg (176 lb 8 oz). Body mass index is 26.84 kg/m .    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)       Allergies   Allergen Reactions     Amlodipine      Other reaction(s): Gingival Hypertrophy     Dapsone      Lisinopril Cough     Nifedipine      Other reaction(s): Edema     Sulfur      Terazosin      Other reaction(s): Intolerance-Can't Take  Sun sensitive     Sulfa Drugs Rash     Thiazide-Type Diuretics Rash       Current Outpatient Medications   Medication Sig Dispense Refill     alfuzosin (UROXATRAL) 10 MG 24 hr tablet Take 1 tablet (10 mg) by mouth daily 90 tablet 1     alprostadil (EDEX) 20 MCG kit 20 mcg by Intracavitary route as needed for erectile dysfunction use no more than 3 times per week 60 mcg 11     AMILORIDE HCL PO Take 5 mg by mouth daily       aspirin 81 MG tablet Take 1 tablet by mouth daily.       atorvastatin (LIPITOR) 40 MG tablet Take 40 mg by mouth       carvedilol (COREG) 25 MG tablet Take 25 mg by mouth       Cholecalciferol (VITAMIN D3 PO) Take by mouth daily       clobetasol (TEMOVATE) 0.05 % cream Apply topically as needed       ibuprofen 200 MG capsule Take 800 mg by mouth as needed.       losartan (COZAAR) 50 MG tablet Take 100 mg by mouth daily        Multiple Vitamins-Minerals (OCUVITE PO) Take 1 tablet by mouth 2 times daily.       mycophenolate (CELLCEPT - GENERIC EQUIVALENT) 500 MG tablet Take 500 mg by mouth 4 times daily       Psyllium (METAMUCIL PO) Take 2 teaspoonful by mouth daily.       rivaroxaban ANTICOAGULANT (XARELTO) 20 MG TABS tablet Take 20 mg by mouth       triamcinolone (KENALOG) 0.1 % cream Apply topically as needed for " irritation       tolterodine (DETROL LA) 2 MG 24 hr capsule Take 1 capsule (2 mg) by mouth daily (Patient not taking: Reported on 1/11/2019) 30 capsule 3       Social History     Tobacco Use     Smoking status: Never Smoker     Smokeless tobacco: Never Used   Substance Use Topics     Alcohol use: Yes     Comment: 3 to 4 times a week     Drug use: No       Verna Jansen LPN  1/11/2019  10:39 AM

## 2019-01-11 NOTE — PROGRESS NOTES
Office Visit Note      UROLOGIC DIAGNOSES:   Hematuria, mild lower urinary tract symptoms, ED     CURRENT INTERVENTIONS:       HISTORY:   Patient with history of hematuria, work up negative.   Patient s/p brachytherapy. Notes some frequency, lower urinary tract symptoms but notes that this is manageable. Detrol was not helpfu.   Also presents for Edex rx, history of ED with good results from Edex.     Patient presents for ED discussion and renewal of Edex rx.         PAST MEDICAL HISTORY:   Past Medical History:   Diagnosis Date     Hypertension      Malignant neoplasm (H)     Prostate Cancer 2005 with seeds       PAST SURGICAL HISTORY:   Past Surgical History:   Procedure Laterality Date     COLONOSCOPY  2/20/2014    Procedure: COLONOSCOPY;  COLONOSCOPY;  Surgeon: Catalino Riddle MD;  Location:  GI     ORTHOPEDIC SURGERY      back for stenosis       FAMILY HISTORY:   Family History   Problem Relation Age of Onset     Diabetes Maternal Grandmother      Cancer Maternal Grandfather        SOCIAL HISTORY:   Social History     Tobacco Use     Smoking status: Never Smoker     Smokeless tobacco: Never Used   Substance Use Topics     Alcohol use: Yes     Comment: 3 to 4 times a week       Current Outpatient Medications   Medication     alfuzosin (UROXATRAL) 10 MG 24 hr tablet     alprostadil (EDEX) 20 MCG kit     AMILORIDE HCL PO     aspirin 81 MG tablet     atorvastatin (LIPITOR) 40 MG tablet     carvedilol (COREG) 25 MG tablet     Cholecalciferol (VITAMIN D3 PO)     clobetasol (TEMOVATE) 0.05 % cream     ibuprofen 200 MG capsule     losartan (COZAAR) 50 MG tablet     Multiple Vitamins-Minerals (OCUVITE PO)     mycophenolate (CELLCEPT - GENERIC EQUIVALENT) 500 MG tablet     Psyllium (METAMUCIL PO)     rivaroxaban ANTICOAGULANT (XARELTO) 20 MG TABS tablet     triamcinolone (KENALOG) 0.1 % cream     tolterodine (DETROL LA) 2 MG 24 hr capsule     No current facility-administered medications for this visit.   "        PHYSICAL EXAM:    /84   Pulse (!) 47   Ht 1.727 m (5' 8\")   Wt 80.1 kg (176 lb 8 oz)   BMI 26.84 kg/m      HEENT: Normocephalic and atraumatic   Cardiac: Not done  Back/Flank: Not done  CNS/PNS: Not done  Respiratory: Normal non-labored breathing  Abdomen: Soft nontender and nondistended  Peripheral Vascular: Not done  Mental Status: Not done    Penis: no hematoma or plaques from edex   Scrotal Skin: no lesions noted   Testicles: no lesions  Epididymis: Not done  Digital Rectal Exam:       Cystoscopy:         Imaging: None    Urinalysis: UA RESULTS:  Recent Labs   Lab Test  07/29/15   1630   COLOR  Yellow   APPEARANCE  Clear   URINEGLC  Negative   URINEBILI  Negative   URINEKETONE  Negative   SG  1.016   UBLD  Negative   URINEPH  6.0   PROTEIN  Negative   NITRITE  Negative   LEUKEST  Trace*   RBCU  0   WBCU  <1       PSA:     Post Void Residual: 16ml    Other labs: None today      IMPRESSION:  77 y/o M with ED and lower urinary tract symptoms, s/p brachytherapy        PLAN:  Rx for Edex given   Follow up in one year   PSA with PCP visit in 04/2019     Total Time: 15 minutes                                       Total in Consultation: greater than 50%                           Answers for HPI/ROS submitted by the patient on 1/11/2019   If you checked off any problems, how difficult have these problems made it for you to do your work, take care of things at home, or get along with other people?: Not difficult at all  PHQ9 TOTAL SCORE: 0    "

## 2019-01-11 NOTE — LETTER
1/11/2019       RE: Castro Arias  [address]  [facility] [address]     Dear Colleague,    Thank you for referring your patient, Castro Arias, to the [facility] UROLOGY AND INST FOR PROSTATE AND UROLOGIC CANCERS at [facility]. Please see a copy of my visit note below.    Office Visit Note      UROLOGIC DIAGNOSES:   Hematuria, mild lower urinary tract symptoms, ED     CURRENT INTERVENTIONS:       HISTORY:   Patient with history of hematuria, work up negative.   Patient s/p brachytherapy. Notes some frequency, lower urinary tract symptoms but notes that this is manageable. Detrol was not helpfu.   Also presents for Edex rx, history of ED with good results from Edex.     Patient presents for ED discussion and renewal of Edex rx.         PAST MEDICAL HISTORY:   Past Medical History:   Diagnosis Date     Hypertension      Malignant neoplasm (H)     Prostate Cancer 2005 with seeds       PAST SURGICAL HISTORY:   Past Surgical History:   Procedure Laterality Date     COLONOSCOPY  2/20/2014    Procedure: COLONOSCOPY;  COLONOSCOPY;  Surgeon: Catalino Riddle MD;  Location:  GI     ORTHOPEDIC SURGERY      back for stenosis       FAMILY HISTORY:   Family History   Problem Relation Age of Onset     Diabetes Maternal Grandmother      Cancer Maternal Grandfather        SOCIAL HISTORY:   Social History     Tobacco Use     Smoking status: Never Smoker     Smokeless tobacco: Never Used   Substance Use Topics     Alcohol use: Yes     Comment: 3 to 4 times a week       Current Outpatient Medications   Medication     alfuzosin (UROXATRAL) 10 MG 24 hr tablet     alprostadil (EDEX) 20 MCG kit     AMILORIDE HCL PO     aspirin 81 MG tablet     atorvastatin (LIPITOR) 40 MG tablet     carvedilol (COREG) 25 MG tablet     Cholecalciferol (VITAMIN D3 PO)     clobetasol (TEMOVATE) 0.05 % cream     ibuprofen 200 MG capsule     losartan (COZAAR) 50 MG tablet     Multiple  "Vitamins-Minerals (OCUVITE PO)     mycophenolate (CELLCEPT - GENERIC EQUIVALENT) 500 MG tablet     Psyllium (METAMUCIL PO)     rivaroxaban ANTICOAGULANT (XARELTO) 20 MG TABS tablet     triamcinolone (KENALOG) 0.1 % cream     tolterodine (DETROL LA) 2 MG 24 hr capsule     No current facility-administered medications for this visit.          PHYSICAL EXAM:    /84   Pulse (!) 47   Ht 1.727 m (5' 8\")   Wt 80.1 kg (176 lb 8 oz)   BMI 26.84 kg/m       HEENT: Normocephalic and atraumatic   Cardiac: Not done  Back/Flank: Not done  CNS/PNS: Not done  Respiratory: Normal non-labored breathing  Abdomen: Soft nontender and nondistended  Peripheral Vascular: Not done  Mental Status: Not done    Penis: no hematoma or plaques from edex   Scrotal Skin: no lesions noted   Testicles: no lesions  Epididymis: Not done  Digital Rectal Exam:       Cystoscopy:         Imaging: None    Urinalysis: UA RESULTS:  Recent Labs   Lab Test  07/29/15   1630   COLOR  Yellow   APPEARANCE  Clear   URINEGLC  Negative   URINEBILI  Negative   URINEKETONE  Negative   SG  1.016   UBLD  Negative   URINEPH  6.0   PROTEIN  Negative   NITRITE  Negative   LEUKEST  Trace*   RBCU  0   WBCU  <1       PSA:     Post Void Residual: 16ml    Other labs: None today      IMPRESSION:  79 y/o M with ED and lower urinary tract symptoms, s/p brachytherapy        PLAN:  Rx for Edex given   Follow up in one year   PSA with PCP visit in 04/2019     Total Time: 15 minutes                                       Total in Consultation: greater than 50%       Answers for HPI/ROS submitted by the patient on 1/11/2019   If you checked off any problems, how difficult have these problems made it for you to do your work, take care of things at home, or get along with other people?: Not difficult at all  PHQ9 TOTAL SCORE: 0      Sanna Amor MD      "

## 2019-01-12 ASSESSMENT — PATIENT HEALTH QUESTIONNAIRE - PHQ9: SUM OF ALL RESPONSES TO PHQ QUESTIONS 1-9: 0

## 2019-10-01 ENCOUNTER — HEALTH MAINTENANCE LETTER (OUTPATIENT)
Age: 79
End: 2019-10-01

## 2019-12-15 ENCOUNTER — HEALTH MAINTENANCE LETTER (OUTPATIENT)
Age: 79
End: 2019-12-15

## 2020-07-21 ENCOUNTER — PRE VISIT (OUTPATIENT)
Dept: UROLOGY | Facility: CLINIC | Age: 80
End: 2020-07-21

## 2020-07-21 NOTE — TELEPHONE ENCOUNTER
Chief Complaint : Follow up - Renew Edex    Hx/Sx: ED, mild LUTS    Records/Orders: Available    Pt Contacted: N/a    At Rooming: Telephone [phone]    Scott Ball, EMT

## 2020-07-31 ENCOUNTER — VIRTUAL VISIT (OUTPATIENT)
Dept: UROLOGY | Facility: CLINIC | Age: 80
End: 2020-07-31
Payer: COMMERCIAL

## 2020-07-31 DIAGNOSIS — N52.9 ERECTILE DYSFUNCTION, UNSPECIFIED ERECTILE DYSFUNCTION TYPE: ICD-10-CM

## 2020-07-31 RX ORDER — MONTELUKAST SODIUM 10 MG/1
TABLET ORAL
COMMUNITY
Start: 2019-11-23

## 2020-07-31 RX ORDER — SPIRONOLACTONE 25 MG/1
TABLET ORAL
COMMUNITY
Start: 2020-01-08

## 2020-07-31 RX ORDER — TORSEMIDE 20 MG/1
TABLET ORAL
COMMUNITY
Start: 2020-07-28

## 2020-07-31 RX ORDER — BIMATOPROST 0.3 MG/ML
SOLUTION/ DROPS OPHTHALMIC
COMMUNITY
Start: 2020-04-05

## 2020-07-31 NOTE — PATIENT INSTRUCTIONS
We'll be mailing your prescription to you today. Please follow up with Jennyfer in one year.    It was a pleasure meeting with you today.  Thank you for allowing me and my team the privilege of caring for you today.  YOU are the reason we are here, and I truly hope we provided you with the excellent service you deserve.  Please let us know if there is anything else we can do for you so that we can be sure you are leaving completely satisfied with your care experience.

## 2020-07-31 NOTE — PROGRESS NOTES
"Castro Arias is a 80 year old male who is being evaluated via a billable telephone visit.      The patient has been notified of following:     \"This telephone visit will be conducted via a call between you and your physician/provider. We have found that certain health care needs can be provided without the need for a physical exam.  This service lets us provide the care you need with a short phone conversation.  If a prescription is necessary we can send it directly to your pharmacy.  If lab work is needed we can place an order for that and you can then stop by our lab to have the test done at a later time.    Telephone visits are billed at different rates depending on your insurance coverage. During this emergency period, for some insurers they may be billed the same as an in-person visit.  Please reach out to your insurance provider with any questions.    If during the course of the call the physician/provider feels a telephone visit is not appropriate, you will not be charged for this service.\"    Patient has given verbal consent for Telephone visit?  Yes    What phone number would you like to be contacted at? [phone]    How would you like to obtain your AVS? Mail a copy, patient would also like a copy of his Edex prescription mailed with.        Castro Arias complains of   Chief Complaint   Patient presents with     Follow Up     Renew medication       80 year old male with a history of hematuria with a negative workup. S/P brachytherapy. Has previously followed with Dr. Amor. At their last visit, the patient reported some frequency and LUTS, but thought it was manageable. Today, he again states that his urinary symptoms are not bothersome, and he is happy.     Was also prescribed Edex for ED and had good results. Is requesting to have this prescription refilled today.     Has his PSA checked through his PCP.     ALLERGIES  Gluten meal; Amlodipine; Dapsone; Lisinopril; Nifedipine; Sulfur; " Terazosin; Sulfa drugs; and Thiazide-type diuretics      Assessment/Plan:  80 year old male with a history of hematuria with negative workup, s/p brachytherapy. History of LUTS, but these are no longer bothersome. Requesting to have his Edex Rx renewed today.   -Will send Edex renewal to pharmacy  -Follow up with me in one year, or sooner if needed.       Phone call duration: 5 minutes    Jennyfer Lawler, CNP

## 2020-07-31 NOTE — LETTER
"7/31/2020       RE: Castro Arias  [address]  [facility] [address]     Dear Colleague,    Thank you for referring your patient, Castro Arias, to the [facility] UROLOGY AND INST FOR PROSTATE AND UROLOGIC CANCERS at [facility]. Please see a copy of my visit note below.    Castro Arias is a 80 year old male who is being evaluated via a billable telephone visit.      The patient has been notified of following:     \"This telephone visit will be conducted via a call between you and your physician/provider. We have found that certain health care needs can be provided without the need for a physical exam.  This service lets us provide the care you need with a short phone conversation.  If a prescription is necessary we can send it directly to your pharmacy.  If lab work is needed we can place an order for that and you can then stop by our lab to have the test done at a later time.    Telephone visits are billed at different rates depending on your insurance coverage. During this emergency period, for some insurers they may be billed the same as an in-person visit.  Please reach out to your insurance provider with any questions.    If during the course of the call the physician/provider feels a telephone visit is not appropriate, you will not be charged for this service.\"    Patient has given verbal consent for Telephone visit?  Yes    What phone number would you like to be contacted at? [phone]    How would you like to obtain your AVS? Mail a copy, patient would also like a copy of his Edex prescription mailed with.        Castro Arias complains of   Chief Complaint   Patient presents with     Follow Up     Renew medication       80 year old male with a history of hematuria with a negative workup. S/P brachytherapy. Has previously followed with Dr. Amor. At their last visit, the patient reported some frequency and LUTS, but thought it was " manageable. Today, he again states that his urinary symptoms are not bothersome, and he is happy.     Was also prescribed Edex for ED and had good results. Is requesting to have this prescription refilled today.     Has his PSA checked through his PCP.     ALLERGIES  Gluten meal; Amlodipine; Dapsone; Lisinopril; Nifedipine; Sulfur; Terazosin; Sulfa drugs; and Thiazide-type diuretics      Assessment/Plan:  80 year old male with a history of hematuria with negative workup, s/p brachytherapy. History of LUTS, but these are no longer bothersome. Requesting to have his Edex Rx renewed today.   -Will send Edex renewal to pharmacy  -Follow up with me in one year, or sooner if needed.       Phone call duration: 5 minutes    Jennyfer Lawler, CNP

## 2020-08-03 ENCOUNTER — TELEPHONE (OUTPATIENT)
Dept: UROLOGY | Facility: CLINIC | Age: 80
End: 2020-08-03

## 2020-08-03 DIAGNOSIS — N52.9 ERECTILE DYSFUNCTION, UNSPECIFIED ERECTILE DYSFUNCTION TYPE: ICD-10-CM

## 2020-08-03 RX ORDER — ALPROSTADIL 20 UG/ML
20 INJECTION, POWDER, LYOPHILIZED, FOR SOLUTION INTRACAVERNOUS PRN
Qty: 60 MCG | Refills: 11 | Status: SHIPPED | OUTPATIENT
Start: 2020-08-03

## 2020-08-03 NOTE — TELEPHONE ENCOUNTER
[facility] Call Center    Phone Message    May a detailed message be left on voicemail: no     Reason for Call: Medication Question or concern regarding medication   Prescription Clarification  Name of Medication: alprostadil (EDEX) 20 MCG kit  60 mcg  11  1/30/2018    Prescribing Provider: Jennyfer Lawler CNP Pharmacy: [facility] # [phone]  / Fax: [phone]   What on the order needs clarification? Castro is out of his prescription- High Priority      Action Taken: Message routed to:  [facility] Urology    Travel Screening: Not Applicable

## 2020-08-04 ENCOUNTER — TELEPHONE (OUTPATIENT)
Dept: UROLOGY | Facility: CLINIC | Age: 80
End: 2020-08-04

## 2020-08-04 NOTE — TELEPHONE ENCOUNTER
[facility] Call Center    Phone Message    May a detailed message be left on voicemail: yes     Reason for Call: Medication Refill Request    Has the patient contacted the pharmacy for the refill? Yes   Name of medication being requested: alprostadil (EDEX) 20 MCG kit  Provider who prescribed the medication: Jennyfer Lawler  Pharmacy: [facility] - [address] [place] [address], [address]   Date medication is needed: 8/4/20  Castro says there is an issue on getting a refill of his   alprostadil (EDEX) 20 MCG kit, because his pharmacy is not sure if he take 60 mcg per dose or 20 mcg per dose. Castro would like to request the care team calls the pharmacy to verify. Please give Castro a call back with any questions.    Action Taken: Message routed to:  [facility] HARRY Uro    Travel Screening: Not Applicable

## 2020-10-27 ENCOUNTER — MYC MEDICAL ADVICE (OUTPATIENT)
Dept: UROLOGY | Facility: CLINIC | Age: 80
End: 2020-10-27

## 2020-11-02 DIAGNOSIS — N52.9 ERECTILE DYSFUNCTION, UNSPECIFIED ERECTILE DYSFUNCTION TYPE: ICD-10-CM

## 2020-11-02 RX ORDER — ALPROSTADIL 20 UG/ML
20 INJECTION, POWDER, LYOPHILIZED, FOR SOLUTION INTRACAVERNOUS PRN
Qty: 60 MCG | Refills: 11 | Status: CANCELLED | OUTPATIENT
Start: 2020-11-02

## 2020-11-02 NOTE — TELEPHONE ENCOUNTER
Called patient to discuss reported hematuria. He states that it has now resolved. Given that he is coming up on 3 years since his last cystoscopy, I suggested that we set him up for one soon to evaluate the interior of his bladder. However, in light of the COVID-19 pandemic, the patient would like to defer this now. He states that he will pursue this after a COVID vaccine is available. I did, however, encourage him to follow up sooner if hematuria recurs and becomes heavier.     Jennyfer Lawler, CNP

## 2021-01-15 ENCOUNTER — HEALTH MAINTENANCE LETTER (OUTPATIENT)
Age: 81
End: 2021-01-15

## 2021-01-31 ENCOUNTER — IMMUNIZATION (OUTPATIENT)
Dept: NURSING | Facility: CLINIC | Age: 81
End: 2021-01-31
Payer: COMMERCIAL

## 2021-01-31 PROCEDURE — 0001A PR COVID VAC PFIZER DIL RECON 30 MCG/0.3 ML IM: CPT

## 2021-01-31 PROCEDURE — 91300 PR COVID VAC PFIZER DIL RECON 30 MCG/0.3 ML IM: CPT

## 2021-02-21 ENCOUNTER — IMMUNIZATION (OUTPATIENT)
Dept: NURSING | Facility: CLINIC | Age: 81
End: 2021-02-21
Attending: PHYSICIAN ASSISTANT
Payer: COMMERCIAL

## 2021-02-21 PROCEDURE — 91300 PR COVID VAC PFIZER DIL RECON 30 MCG/0.3 ML IM: CPT

## 2021-02-21 PROCEDURE — 0002A PR COVID VAC PFIZER DIL RECON 30 MCG/0.3 ML IM: CPT

## 2021-10-24 ENCOUNTER — HEALTH MAINTENANCE LETTER (OUTPATIENT)
Age: 81
End: 2021-10-24

## 2022-02-13 ENCOUNTER — HEALTH MAINTENANCE LETTER (OUTPATIENT)
Age: 82
End: 2022-02-13

## 2022-04-04 ENCOUNTER — TRANSCRIBE ORDERS (OUTPATIENT)
Dept: OTHER | Age: 82
End: 2022-04-04
Payer: COMMERCIAL

## 2022-04-04 DIAGNOSIS — I50.30 HEART FAILURE WITH PRESERVED EJECTION FRACTION, UNSPECIFIED HF CHRONICITY (H): Primary | ICD-10-CM

## 2022-04-04 DIAGNOSIS — Z95.5 STATUS POST INSERTION OF DRUG ELUTING CORONARY ARTERY STENT: Primary | ICD-10-CM

## 2022-04-12 ENCOUNTER — HOSPITAL ENCOUNTER (OUTPATIENT)
Dept: CARDIAC REHAB | Facility: CLINIC | Age: 82
Discharge: HOME | End: 2022-04-12
Attending: INTERNAL MEDICINE
Payer: COMMERCIAL

## 2022-04-12 PROCEDURE — 93798 PHYS/QHP OP CAR RHAB W/ECG: CPT | Performed by: REHABILITATION PRACTITIONER

## 2022-04-22 ENCOUNTER — HOSPITAL ENCOUNTER (OUTPATIENT)
Dept: CARDIAC REHAB | Facility: CLINIC | Age: 82
Discharge: HOME | End: 2022-04-22
Attending: INTERNAL MEDICINE
Payer: COMMERCIAL

## 2022-04-22 PROCEDURE — 93798 PHYS/QHP OP CAR RHAB W/ECG: CPT | Performed by: CLINICAL EXERCISE PHYSIOLOGIST

## 2022-04-27 ENCOUNTER — HOSPITAL ENCOUNTER (OUTPATIENT)
Dept: CARDIAC REHAB | Facility: CLINIC | Age: 82
Discharge: HOME | End: 2022-04-27
Attending: INTERNAL MEDICINE
Payer: COMMERCIAL

## 2022-04-27 PROCEDURE — 93798 PHYS/QHP OP CAR RHAB W/ECG: CPT | Performed by: REHABILITATION PRACTITIONER

## 2022-10-16 ENCOUNTER — HEALTH MAINTENANCE LETTER (OUTPATIENT)
Age: 82
End: 2022-10-16

## 2023-03-26 ENCOUNTER — HEALTH MAINTENANCE LETTER (OUTPATIENT)
Age: 83
End: 2023-03-26

## 2024-03-06 ENCOUNTER — TRANSCRIBE ORDERS (OUTPATIENT)
Dept: OTHER | Age: 84
End: 2024-03-06

## 2024-03-06 DIAGNOSIS — G60.8 PERIPHERAL SENSORY NEUROPATHY: Primary | ICD-10-CM

## 2024-06-01 ENCOUNTER — HEALTH MAINTENANCE LETTER (OUTPATIENT)
Age: 84
End: 2024-06-01